# Patient Record
Sex: MALE | Race: WHITE | NOT HISPANIC OR LATINO | URBAN - METROPOLITAN AREA
[De-identification: names, ages, dates, MRNs, and addresses within clinical notes are randomized per-mention and may not be internally consistent; named-entity substitution may affect disease eponyms.]

---

## 2020-01-07 ENCOUNTER — EMERGENCY (EMERGENCY)
Facility: HOSPITAL | Age: 40
LOS: 1 days | Discharge: HOME | End: 2020-01-07
Attending: EMERGENCY MEDICINE | Admitting: EMERGENCY MEDICINE
Payer: COMMERCIAL

## 2020-01-07 VITALS
SYSTOLIC BLOOD PRESSURE: 111 MMHG | TEMPERATURE: 98 F | OXYGEN SATURATION: 100 % | RESPIRATION RATE: 18 BRPM | HEART RATE: 74 BPM | DIASTOLIC BLOOD PRESSURE: 78 MMHG

## 2020-01-07 DIAGNOSIS — Z98.89 OTHER SPECIFIED POSTPROCEDURAL STATES: Chronic | ICD-10-CM

## 2020-01-07 LAB
ALBUMIN SERPL ELPH-MCNC: 4.8 G/DL — SIGNIFICANT CHANGE UP (ref 3.3–5)
ALP SERPL-CCNC: 67 U/L — SIGNIFICANT CHANGE UP (ref 40–120)
ALT FLD-CCNC: 72 U/L — HIGH (ref 4–41)
ANION GAP SERPL CALC-SCNC: 15 MMO/L — HIGH (ref 7–14)
APPEARANCE UR: SIGNIFICANT CHANGE UP
AST SERPL-CCNC: 49 U/L — HIGH (ref 4–40)
BACTERIA # UR AUTO: NEGATIVE — SIGNIFICANT CHANGE UP
BASOPHILS # BLD AUTO: 0.04 K/UL — SIGNIFICANT CHANGE UP (ref 0–0.2)
BASOPHILS NFR BLD AUTO: 0.4 % — SIGNIFICANT CHANGE UP (ref 0–2)
BILIRUB SERPL-MCNC: 1.3 MG/DL — HIGH (ref 0.2–1.2)
BILIRUB UR-MCNC: NEGATIVE — SIGNIFICANT CHANGE UP
BLOOD UR QL VISUAL: HIGH
BUN SERPL-MCNC: 17 MG/DL — SIGNIFICANT CHANGE UP (ref 7–23)
CALCIUM SERPL-MCNC: 9.5 MG/DL — SIGNIFICANT CHANGE UP (ref 8.4–10.5)
CHLORIDE SERPL-SCNC: 98 MMOL/L — SIGNIFICANT CHANGE UP (ref 98–107)
CO2 SERPL-SCNC: 24 MMOL/L — SIGNIFICANT CHANGE UP (ref 22–31)
COLOR SPEC: YELLOW — SIGNIFICANT CHANGE UP
CREAT SERPL-MCNC: 0.92 MG/DL — SIGNIFICANT CHANGE UP (ref 0.5–1.3)
EOSINOPHIL # BLD AUTO: 0.05 K/UL — SIGNIFICANT CHANGE UP (ref 0–0.5)
EOSINOPHIL NFR BLD AUTO: 0.5 % — SIGNIFICANT CHANGE UP (ref 0–6)
GLUCOSE SERPL-MCNC: 202 MG/DL — HIGH (ref 70–99)
GLUCOSE UR-MCNC: 500 — HIGH
HCT VFR BLD CALC: 45.2 % — SIGNIFICANT CHANGE UP (ref 39–50)
HGB BLD-MCNC: 15.7 G/DL — SIGNIFICANT CHANGE UP (ref 13–17)
HYALINE CASTS # UR AUTO: NEGATIVE — SIGNIFICANT CHANGE UP
IMM GRANULOCYTES NFR BLD AUTO: 0.3 % — SIGNIFICANT CHANGE UP (ref 0–1.5)
KETONES UR-MCNC: NEGATIVE — SIGNIFICANT CHANGE UP
LEUKOCYTE ESTERASE UR-ACNC: NEGATIVE — SIGNIFICANT CHANGE UP
LYMPHOCYTES # BLD AUTO: 1.29 K/UL — SIGNIFICANT CHANGE UP (ref 1–3.3)
LYMPHOCYTES # BLD AUTO: 12.4 % — LOW (ref 13–44)
MCHC RBC-ENTMCNC: 30.9 PG — SIGNIFICANT CHANGE UP (ref 27–34)
MCHC RBC-ENTMCNC: 34.7 % — SIGNIFICANT CHANGE UP (ref 32–36)
MCV RBC AUTO: 89 FL — SIGNIFICANT CHANGE UP (ref 80–100)
MONOCYTES # BLD AUTO: 0.65 K/UL — SIGNIFICANT CHANGE UP (ref 0–0.9)
MONOCYTES NFR BLD AUTO: 6.3 % — SIGNIFICANT CHANGE UP (ref 2–14)
NEUTROPHILS # BLD AUTO: 8.33 K/UL — HIGH (ref 1.8–7.4)
NEUTROPHILS NFR BLD AUTO: 80.1 % — HIGH (ref 43–77)
NITRITE UR-MCNC: NEGATIVE — SIGNIFICANT CHANGE UP
NRBC # FLD: 0 K/UL — SIGNIFICANT CHANGE UP (ref 0–0)
PH UR: 5.5 — SIGNIFICANT CHANGE UP (ref 5–8)
PLATELET # BLD AUTO: 205 K/UL — SIGNIFICANT CHANGE UP (ref 150–400)
PMV BLD: 9.2 FL — SIGNIFICANT CHANGE UP (ref 7–13)
POTASSIUM SERPL-MCNC: 4.8 MMOL/L — SIGNIFICANT CHANGE UP (ref 3.5–5.3)
POTASSIUM SERPL-SCNC: 4.8 MMOL/L — SIGNIFICANT CHANGE UP (ref 3.5–5.3)
PROT SERPL-MCNC: 7.8 G/DL — SIGNIFICANT CHANGE UP (ref 6–8.3)
PROT UR-MCNC: 20 — SIGNIFICANT CHANGE UP
RBC # BLD: 5.08 M/UL — SIGNIFICANT CHANGE UP (ref 4.2–5.8)
RBC # FLD: 12.2 % — SIGNIFICANT CHANGE UP (ref 10.3–14.5)
RBC CASTS # UR COMP ASSIST: SIGNIFICANT CHANGE UP (ref 0–?)
SODIUM SERPL-SCNC: 137 MMOL/L — SIGNIFICANT CHANGE UP (ref 135–145)
SP GR SPEC: 1.02 — SIGNIFICANT CHANGE UP (ref 1–1.04)
SQUAMOUS # UR AUTO: SIGNIFICANT CHANGE UP
UROBILINOGEN FLD QL: NORMAL — SIGNIFICANT CHANGE UP
WBC # BLD: 10.39 K/UL — SIGNIFICANT CHANGE UP (ref 3.8–10.5)
WBC # FLD AUTO: 10.39 K/UL — SIGNIFICANT CHANGE UP (ref 3.8–10.5)
WBC UR QL: SIGNIFICANT CHANGE UP (ref 0–?)

## 2020-01-07 PROCEDURE — 99284 EMERGENCY DEPT VISIT MOD MDM: CPT

## 2020-01-07 RX ORDER — KETOROLAC TROMETHAMINE 30 MG/ML
15 SYRINGE (ML) INJECTION ONCE
Refills: 0 | Status: DISCONTINUED | OUTPATIENT
Start: 2020-01-07 | End: 2020-01-07

## 2020-01-07 RX ADMIN — Medication 15 MILLIGRAM(S): at 12:49

## 2020-01-07 NOTE — ED ADULT NURSE NOTE - OBJECTIVE STATEMENT
c/o left sided lower quadrant pain since last night denies fever chills sob, states pain is similar to previous kidney stone, denies dysuria, ambulatory to bathroom,

## 2020-01-07 NOTE — ED ADULT TRIAGE NOTE - CHIEF COMPLAINT QUOTE
Pt co left flank pain since this morning. Believes he has a kidney stone, hx of kidney stone in past. States pain was 10/10 this morning but is not 3/10. PMHX of diabetes, high cholesterol, appendectomy, part of colon removed.

## 2020-01-07 NOTE — ED PROVIDER NOTE - NSFOLLOWUPCLINICS_GEN_ALL_ED_FT
[facility]  Urology  [address] [place] [address] - [address] [place] [address]  Phone: [phone]  Fax:   Follow Up Time:

## 2020-01-07 NOTE — ED PROVIDER NOTE - ATTENDING CONTRIBUTION TO CARE
39M LLQ pain radiating to groin and testicles. Prior h/o kidney stones, this pain feels similar. No testicular swelling. No dysuria. No hematuria. No fever, no n/v/d. Pain is now improving. On exam well appearing, nad, mmm, rrr, lungs CTA b/l, abd soft NT/ND, no CVA tenderness, 2+ pulses, no edema, no rash, alert, speech clear. Pain is consistent with a kidney stone, no other GI complaints. No symptoms of infection. Given resolution of pain, no indication for imaging at this time, will treat conservatively with symptomatic treatment and patient given return precautions.

## 2020-01-07 NOTE — ED PROVIDER NOTE - PROGRESS NOTE DETAILS
pt feeling much better. labs/urinalysis c/w nephrolithiasis. uro f/u and strict return precautions given. pt feeling much better. labs/urinalysis c/w nephrolithiasis. uro f/u and strict return precautions given. pt knows to f/u with pcp about transaminitis and bili

## 2020-01-07 NOTE — ED PROVIDER NOTE - PATIENT PORTAL LINK FT
You can access the FollowMyHealth Patient Portal offered by [facility] by registering at the following website: [email or web address]. By joining [employer]’s FollowMyHealth portal, you will also be able to view your health information using other applications (apps) compatible with our system.

## 2020-01-07 NOTE — ED PROVIDER NOTE - PSH
S/P appendectomy  1990  S/P inguinal hernia repair  right, 2001  S/P meniscectomy  left, 2012  S/P tonsillectomy  1983

## 2020-01-07 NOTE — ED PROVIDER NOTE - CLINICAL SUMMARY MEDICAL DECISION MAKING FREE TEXT BOX
LLQ/groin pain + nausea. C/w past kidney stone. Pt well appearing, pain nearly resolved. Abdomen benign. Exam and history c/w kidney stone. Low suspicion for torsion or acute surgical abdomen. Labs, UA, pain control, & reassess.

## 2020-01-07 NOTE — ED PROVIDER NOTE - PHYSICAL EXAMINATION
Gen: AAOx3, non-toxic  Head: NCAT  HEENT: EOMI, oral mucosa moist, normal conjunctiva  Lung: CTAB, no respiratory distress, no wheezes/rhonchi/rales B/L, speaking in full sentences  CV: RRR, no murmurs, rubs or gallops  Abd: soft, NTND, no guarding, no CVA tenderness  MSK: no visible deformities  Neuro: No focal sensory or motor deficits  Skin: Warm, well perfused, no rash  Psych: normal affect.     ~Juan Manuel Mandujano PGY2 Gen: AAOx3, non-toxic  Head: NCAT  HEENT: EOMI, oral mucosa moist, normal conjunctiva  Lung: CTAB, no respiratory distress, no wheezes/rhonchi/rales B/L, speaking in full sentences  CV: RRR, no murmurs, rubs or gallops  Abd: soft, NTND, no guarding, no CVA tenderness  : no testicular redness/tenderness/swelling, +b/l cremasteric reflex, no inguinal hernias  MSK: no visible deformities  Neuro: No focal sensory or motor deficits  Skin: Warm, well perfused, no rash  Psych: normal affect.     ~Juan Manuel Mandujano PGY2

## 2020-01-07 NOTE — ED PROVIDER NOTE - OBJECTIVE STATEMENT
39M with PMH of HLD, DM, and kidney stones p/w sharp, colicky LLQ pain that radiates to his groin since this morning. States current symptoms exactly like past kidney stone which was CT-confirmed. Pain was 10/10 but has improved to 2/10. Associated with nausea. Denies any other symptom including fever, dysuria, frequency, hematuria, vomiting, diarrhea, chest pain, SOB.

## 2020-01-07 NOTE — ED PROVIDER NOTE - NS ED ROS FT
ROS:  GENERAL: No fever, no chills  EYES: no change in vision  HEENT: no trouble swallowing, no trouble speaking  CARDIAC: no chest pain  PULMONARY: no cough, no shortness of breath  GI: +abd pain as in HPI, +nausea.  no vomiting, no diarrhea, no constipation  : No dysuria, no frequency, no change in appearance, or odor of urine  SKIN: no rashes  NEURO: no headache, no weakness  MSK: No joint pain    Juan Manuel Mandujano PGY2

## 2020-01-07 NOTE — ED PROVIDER NOTE - NSFOLLOWUPINSTRUCTIONS_ED_ALL_ED_FT
Follow up with your PCP in 24-48 hours.   Call urology to make a follow up appointment.   May take percocet 5 mg every 6 hours for severe pain.   May take Motrin as directed on the bottle for pain control.   Return to the ER if you develop any new or worsening symptoms such as fever, chest pain, shortness of breath, numbness, weakness, abdominal pain, nausea, vomiting, or visual changes.

## 2020-01-08 LAB — SPECIMEN SOURCE: SIGNIFICANT CHANGE UP

## 2020-01-09 LAB — BACTERIA UR CULT: SIGNIFICANT CHANGE UP

## 2022-07-17 ENCOUNTER — NON-APPOINTMENT (OUTPATIENT)
Age: 42
End: 2022-07-17

## 2022-09-27 ENCOUNTER — APPOINTMENT (OUTPATIENT)
Dept: ORTHOPEDIC SURGERY | Facility: CLINIC | Age: 42
End: 2022-09-27

## 2022-09-27 VITALS — HEIGHT: 68.5 IN | BODY MASS INDEX: 25.77 KG/M2 | WEIGHT: 172 LBS

## 2022-09-27 DIAGNOSIS — Z78.9 OTHER SPECIFIED HEALTH STATUS: ICD-10-CM

## 2022-09-27 DIAGNOSIS — Z86.39 PERSONAL HISTORY OF OTHER ENDOCRINE, NUTRITIONAL AND METABOLIC DISEASE: ICD-10-CM

## 2022-09-27 PROCEDURE — 73030 X-RAY EXAM OF SHOULDER: CPT | Mod: LT

## 2022-09-27 PROCEDURE — 99203 OFFICE O/P NEW LOW 30 MIN: CPT

## 2022-09-27 RX ORDER — DICLOFENAC SODIUM 75 MG/1
75 TABLET, DELAYED RELEASE ORAL
Qty: 60 | Refills: 3 | Status: ACTIVE | COMMUNITY
Start: 2022-09-27 | End: 1900-01-01

## 2022-09-27 RX ORDER — ROSUVASTATIN CALCIUM 5 MG/1
TABLET, FILM COATED ORAL
Refills: 0 | Status: ACTIVE | COMMUNITY

## 2022-09-27 RX ORDER — FENOFIBRATE 145 MG/1
145 TABLET ORAL
Refills: 0 | Status: ACTIVE | COMMUNITY

## 2022-09-27 RX ORDER — ICOSAPENT ETHYL 1000 MG/1
1 CAPSULE ORAL
Refills: 0 | Status: ACTIVE | COMMUNITY

## 2022-09-27 RX ORDER — CHOLECALCIFEROL (VITAMIN D3) 1250 MCG
1.25 MG CAPSULE ORAL
Refills: 0 | Status: ACTIVE | COMMUNITY

## 2022-09-27 RX ORDER — METFORMIN HYDROCHLORIDE 1000 MG/1
1000 TABLET, COATED ORAL
Refills: 0 | Status: ACTIVE | COMMUNITY

## 2022-09-27 RX ORDER — TIRZEPATIDE 10 MG/.5ML
10 INJECTION, SOLUTION SUBCUTANEOUS
Refills: 0 | Status: ACTIVE | COMMUNITY

## 2022-09-27 NOTE — PHYSICAL EXAM
[NL (0-180)] : full active forward flexion 0-180 degrees [NL (0-70)] : full internal rotation 0-70 degrees [NL (0-90)] : full external rotation 0-90 degrees [Sitting] : sitting [Mild] : mild [Left] : left shoulder [There are no fractures, subluxations or dislocations. No significant abnormalities are seen] : There are no fractures, subluxations or dislocations. No significant abnormalities are seen [] : no sensory deficits [FreeTextEntry1] : foreign object arm, glucose monitor

## 2022-09-27 NOTE — ASSESSMENT
[FreeTextEntry1] : is a diabetic, will hold on an injection. Prescribed PT, diclofenac  and MRI left shoulder.

## 2022-09-27 NOTE — REASON FOR VISIT
[FreeTextEntry2] : Patient is a 41 year old RHD male presents with complaints of L Shoulder pain for several months. No injury or trauma. Pain when lifting or reaching. No Neck pain No radiating pain.No chest pain. Is a diabetic, on medication, last glucose was 166 today.

## 2022-09-27 NOTE — HISTORY OF PRESENT ILLNESS
[Left Arm] : left arm [Gradual] : gradual [8] : 8 [2] : 2 [Localized] : localized [Intermittent] : intermittent [Household chores] : household chores [Leisure] : leisure [Rest] : rest [Exercising] : exercising [Full time] : Work status: full time [] : Post Surgical Visit: no [de-identified] :

## 2022-09-28 ENCOUNTER — FORM ENCOUNTER (OUTPATIENT)
Age: 42
End: 2022-09-28

## 2022-09-29 ENCOUNTER — APPOINTMENT (OUTPATIENT)
Dept: MRI IMAGING | Facility: CLINIC | Age: 42
End: 2022-09-29

## 2022-09-29 PROCEDURE — 73221 MRI JOINT UPR EXTREM W/O DYE: CPT | Mod: LT

## 2022-10-10 ENCOUNTER — APPOINTMENT (OUTPATIENT)
Dept: ORTHOPEDIC SURGERY | Facility: CLINIC | Age: 42
End: 2022-10-10

## 2022-10-10 VITALS — HEIGHT: 68.5 IN | WEIGHT: 172 LBS | BODY MASS INDEX: 25.77 KG/M2

## 2022-10-10 DIAGNOSIS — M77.8 OTHER ENTHESOPATHIES, NOT ELSEWHERE CLASSIFIED: ICD-10-CM

## 2022-10-10 PROCEDURE — 99214 OFFICE O/P EST MOD 30 MIN: CPT

## 2022-10-10 NOTE — PHYSICAL EXAM
[Left] : left shoulder [Sitting] : sitting [5___] : internal rotation 5[unfilled]/5 [] : no sensory deficits

## 2022-10-10 NOTE — HISTORY OF PRESENT ILLNESS
[Gradual] : gradual [2] : 2 [Dull/Aching] : dull/aching [Intermittent] : intermittent [Rest] : rest [Meds] : meds [Full time] : Work status: full time [] : Post Surgical Visit: no [FreeTextEntry9] : Advil [de-identified] : 09/27/22 [de-identified] : Dr. Sims [de-identified] : MRI [de-identified] :

## 2022-10-10 NOTE — REASON FOR VISIT
[FreeTextEntry2] : Patient complains of continued L Shoulder pain since his last visit. Pain increases with certain movements. Patient states that he started taking Diclofenac but he has since stopped taking it because of nausea after taking it. Patient would like to discuss MRI results: no RC tears, has labral pathology.

## 2022-10-10 NOTE — ASSESSMENT
[FreeTextEntry1] : discussed MRI results. Advil helps, no c/o pain with working. No need foe surgery at this point.

## 2022-11-14 ENCOUNTER — APPOINTMENT (OUTPATIENT)
Dept: SURGERY | Facility: CLINIC | Age: 42
End: 2022-11-14

## 2022-11-14 VITALS
OXYGEN SATURATION: 99 % | WEIGHT: 163 LBS | DIASTOLIC BLOOD PRESSURE: 79 MMHG | BODY MASS INDEX: 24.71 KG/M2 | HEART RATE: 82 BPM | SYSTOLIC BLOOD PRESSURE: 128 MMHG | HEIGHT: 68 IN | TEMPERATURE: 98.2 F

## 2022-11-14 DIAGNOSIS — K80.20 CALCULUS OF GALLBLADDER W/OUT CHOLECYSTITIS W/OUT OBSTRUCTION: ICD-10-CM

## 2022-11-14 PROCEDURE — 99203 OFFICE O/P NEW LOW 30 MIN: CPT

## 2022-11-30 ENCOUNTER — APPOINTMENT (OUTPATIENT)
Dept: ENDOCRINOLOGY | Facility: CLINIC | Age: 42
End: 2022-11-30

## 2022-12-08 ENCOUNTER — OFFICE (OUTPATIENT)
Dept: URBAN - METROPOLITAN AREA CLINIC 109 | Facility: CLINIC | Age: 42
Setting detail: OPHTHALMOLOGY
End: 2022-12-08
Payer: COMMERCIAL

## 2022-12-08 DIAGNOSIS — E11.9: ICD-10-CM

## 2022-12-08 DIAGNOSIS — H35.413: ICD-10-CM

## 2022-12-08 PROCEDURE — 92201 OPSCPY EXTND RTA DRAW UNI/BI: CPT | Performed by: OPHTHALMOLOGY

## 2022-12-08 PROCEDURE — 92004 COMPRE OPH EXAM NEW PT 1/>: CPT | Performed by: OPHTHALMOLOGY

## 2022-12-08 ASSESSMENT — REFRACTION_AUTOREFRACTION
OS_AXIS: 82
OD_SPHERE: -0.75
OD_CYLINDER: -1.50
OD_AXIS: 111
OS_CYLINDER: -1.50
OS_SPHERE: -0.50

## 2022-12-08 ASSESSMENT — CONFRONTATIONAL VISUAL FIELD TEST (CVF)
OS_FINDINGS: FULL
OD_FINDINGS: FULL

## 2022-12-08 ASSESSMENT — TONOMETRY
OS_IOP_MMHG: 18
OD_IOP_MMHG: 18

## 2022-12-08 ASSESSMENT — SPHEQUIV_DERIVED
OS_SPHEQUIV: -1.25
OD_SPHEQUIV: -1.5

## 2022-12-08 ASSESSMENT — REFRACTION_CURRENTRX
OS_CYLINDER: -0.75
OD_AXIS: 107
OS_OVR_VA: 20/
OS_SPHERE: -0.50
OS_AXIS: 73
OD_OVR_VA: 20/
OD_CYLINDER: -0.75
OD_SPHERE: -0.50

## 2022-12-08 ASSESSMENT — VISUAL ACUITY
OS_BCVA: 20/20-1
OD_BCVA: 20/20

## 2023-03-15 ENCOUNTER — NON-APPOINTMENT (OUTPATIENT)
Age: 43
End: 2023-03-15

## 2024-04-22 ENCOUNTER — APPOINTMENT (OUTPATIENT)
Dept: PODIATRY | Facility: CLINIC | Age: 44
End: 2024-04-22
Payer: COMMERCIAL

## 2024-04-22 PROCEDURE — 73620 X-RAY EXAM OF FOOT: CPT | Mod: 50

## 2024-04-22 PROCEDURE — 99203 OFFICE O/P NEW LOW 30 MIN: CPT | Mod: 25

## 2024-04-22 RX ORDER — DICLOFENAC SODIUM 100 MG/1
100 TABLET, FILM COATED, EXTENDED RELEASE ORAL
Qty: 30 | Refills: 0 | Status: ACTIVE | COMMUNITY
Start: 2024-04-22 | End: 1900-01-01

## 2024-04-22 NOTE — HISTORY OF PRESENT ILLNESS
[FreeTextEntry1] : Flo is a 43-year-old male who presents today for the first time for care of pain in his right heel and left 5th toe.  It's been going on for a few months.  It has improved slightly since he bought [employer] sneakers.

## 2024-04-22 NOTE — PROCEDURE
[FreeTextEntry1] : Plan:   Examination.  (Rc=[id number]).  We had a lengthy discussion concerning the patient's condition.  We reviewed his x-rays.  Rx: Voltaren 100 xr qd pc.  We discussed custom foot orthosis.  He is to continue all activity to tolerance. Patient to return: 1 week

## 2024-04-22 NOTE — PHYSICAL EXAM
[FreeTextEntry3] :  DP 2/4 bilaterally,  PT 2/4 bilaterally, Temperature gradient normal, Capillary return instant x 10 [de-identified] : Patient presents with a hypermobile varus foot type.  Discomfort upon palpation of the plantar right heel and lateral to the 5th metatarsal head.  3/5 pain scale.  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 XRAY: 2 views of both feet demonstrate no plantar spur formation.  Flattening of the long arch on the lateral with splaying of the metatarsals on the AP.  No fracture or dislocation. [FreeTextEntry1] : Neurological Exam: Sharp / Dull - L: WNL. Sharp / Dull - R: WNL. Light Touch/pressure - L: WNL. Light Touch/pressure - R: WNL. Hot/cold - L: WNL. Hot/cold - R: WNL. Vibratory - L: WNL. Vibratory - R: WNL.

## 2024-04-24 NOTE — PHYSICAL EXAM
[FreeTextEntry3] :  DP 2/4 bilaterally,  PT 2/4 bilaterally, Temperature gradient normal, Capillary return instant x 10 [de-identified] : Patient presents with a hypermobile varus foot type.  Discomfort upon palpation of the plantar right heel and lateral to the 5th metatarsal head.  3/5 pain scale.  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 XRAY: 2 views of both feet demonstrate no plantar spur formation.  Flattening of the long arch on the lateral with splaying of the metatarsals on the AP.  No fracture or dislocation. [FreeTextEntry1] : Neurological Exam: Sharp / Dull - L: WNL. Sharp / Dull - R: WNL. Light Touch/pressure - L: WNL. Light Touch/pressure - R: WNL. Hot/cold - L: WNL. Hot/cold - R: WNL. Vibratory - L: WNL. Vibratory - R: WNL.

## 2024-04-24 NOTE — HISTORY OF PRESENT ILLNESS
[FreeTextEntry1] : Flo is a 43-year-old male who presents today for the first time for care of pain in his right heel and left 5th toe.  It's been going on for a few months.  It has improved slightly since he bought [employer] sneakers.

## 2024-04-24 NOTE — PROCEDURE
[FreeTextEntry1] : Plan:   Examination.  (Ih=[id number]).  We had a lengthy discussion concerning the patient's condition.  We reviewed his x-rays.  Rx: Voltaren 100 xr qd pc.  We discussed custom foot orthosis.  He is to continue all activity to tolerance. Patient to return: 1 week

## 2024-04-25 DIAGNOSIS — F19.90 OTHER PSYCHOACTIVE SUBSTANCE USE, UNSPECIFIED, UNCOMPLICATED: ICD-10-CM

## 2024-04-25 DIAGNOSIS — Z83.3 FAMILY HISTORY OF DIABETES MELLITUS: ICD-10-CM

## 2024-04-25 DIAGNOSIS — G47.30 SLEEP APNEA, UNSPECIFIED: ICD-10-CM

## 2024-04-25 DIAGNOSIS — Z87.442 PERSONAL HISTORY OF URINARY CALCULI: ICD-10-CM

## 2024-04-25 DIAGNOSIS — Z82.49 FAMILY HISTORY OF ISCHEMIC HEART DISEASE AND OTHER DISEASES OF THE CIRCULATORY SYSTEM: ICD-10-CM

## 2024-04-25 DIAGNOSIS — Z80.3 FAMILY HISTORY OF MALIGNANT NEOPLASM OF BREAST: ICD-10-CM

## 2024-04-30 ENCOUNTER — APPOINTMENT (OUTPATIENT)
Dept: PODIATRY | Facility: CLINIC | Age: 44
End: 2024-04-30
Payer: COMMERCIAL

## 2024-04-30 DIAGNOSIS — M72.2 PLANTAR FASCIAL FIBROMATOSIS: ICD-10-CM

## 2024-04-30 PROCEDURE — 99213 OFFICE O/P EST LOW 20 MIN: CPT

## 2024-04-30 NOTE — PROCEDURE
[FreeTextEntry1] : Plan:   Examination.  (Nb=[id number]).  We had a lengthy discussion concerning the patient's condition.  Patient to continue Voltaren until pain free.  The patient was biomechanically evaluated and electronicallty scanned for custom foot orthosis in the prone position.  The patient will be notified when the orthotics arrive so that they can be fitted to the patient appropriately. (Iu=).   Patient to return: 2 weeks

## 2024-04-30 NOTE — PHYSICAL EXAM
[FreeTextEntry3] :  DP 2/4 bilaterally,  PT 2/4 bilaterally, Temperature gradient normal, Capillary return instant x 10 [de-identified] : Patient presents with a hypermobile varus foot type.  Less discomfort upon palpation of the plantar right heel.  The lateral left foot is pain free.  No pain upon palpation of the fibroma right.  2/5 pain scale.  XRAY: 2 views of both feet demonstrate no plantar spur formation.  Flattening of the long arch on the lateral with splaying of the metatarsals on the AP.  No fracture or dislocation. [FreeTextEntry1] : Neurological Exam: Sharp / Dull - L: WNL. Sharp / Dull - R: WNL. Light Touch/pressure - L: WNL. Light Touch/pressure - R: WNL. Hot/cold - L: WNL. Hot/cold - R: WNL. Vibratory - L: WNL. Vibratory - R: WNL.

## 2024-04-30 NOTE — HISTORY OF PRESENT ILLNESS
[FreeTextEntry1] : Flo is a 43-year-old male who presents this afternoon for follow up care of pain in both feet.  He has been taking his Voltaren without incident.  The fibroma and tendinitis has resolved.  The heel pain is about 60% better.

## 2024-06-21 ENCOUNTER — APPOINTMENT (OUTPATIENT)
Dept: PODIATRY | Facility: CLINIC | Age: 44
End: 2024-06-21
Payer: COMMERCIAL

## 2024-06-21 DIAGNOSIS — M79.672 PAIN IN RIGHT FOOT: ICD-10-CM

## 2024-06-21 DIAGNOSIS — M79.671 PAIN IN RIGHT FOOT: ICD-10-CM

## 2024-06-21 DIAGNOSIS — M77.52 OTHER ENTHESOPATHY OF LT FOOT AND ANKLE: ICD-10-CM

## 2024-06-21 PROCEDURE — 99213 OFFICE O/P EST LOW 20 MIN: CPT

## 2024-06-21 RX ORDER — DICLOFENAC SODIUM 100 MG/1
100 TABLET, FILM COATED, EXTENDED RELEASE ORAL
Qty: 30 | Refills: 0 | Status: ACTIVE | COMMUNITY
Start: 2024-06-21 | End: 1900-01-01

## 2024-06-21 NOTE — PROCEDURE
[FreeTextEntry1] : Plan:  Exam. (My=[id number]).  Rx: Voltaren 100 XR QD x 2 more weeks.  The orthotics appeared to fit well.  The orthotics were checked to make sure they were made appropriately.  Instructions were given as to what to expect during the break in period.  She was told what to look for (ie. high spots or areas that create discomfort) so that adjustments can be made at the next visit.  The orthotics were placed in her shoes. The types of shoes that can be worn with orthotics were reviewed with patient.  A breaking in period was prescribed as follows:  wear orthotics for 1 hour the first day, then increase an hour every day until they are worn all day.  Be sure to wear them for a whole day before attempting any athletic or strenuous activity in them.  They should feel comfortable.  He is to call with any questions or problems. Patient to return: 2 weeks

## 2024-06-21 NOTE — HISTORY OF PRESENT ILLNESS
[FreeTextEntry1] : Flo is a 43-year-old male who presents for continued care of pain in both feet.  He has relief when taking his Voltaren, but the discomfort returns a few days after he stops it.  He is anxious to get his orthosis.

## 2024-06-21 NOTE — PHYSICAL EXAM
[FreeTextEntry3] :  DP 2/4 bilaterally,  PT 2/4 bilaterally, Temperature gradient normal, Capillary return instant x 10 [de-identified] : Orthopedic Exam:  Patient presents with a hypermobile varus foot type.  The orthosis well accommodates his condition. [FreeTextEntry1] : Neurological Exam: Sharp / Dull - L: WNL. Sharp / Dull - R: WNL. Light Touch/pressure - L: WNL. Light Touch/pressure - R: WNL. Hot/cold - L: WNL. Hot/cold - R: WNL. Vibratory - L: WNL. Vibratory - R: WNL.

## 2024-07-08 ENCOUNTER — APPOINTMENT (OUTPATIENT)
Dept: PODIATRY | Facility: CLINIC | Age: 44
End: 2024-07-08
Payer: COMMERCIAL

## 2024-07-08 DIAGNOSIS — M72.2 PLANTAR FASCIAL FIBROMATOSIS: ICD-10-CM

## 2024-07-08 PROCEDURE — 99213 OFFICE O/P EST LOW 20 MIN: CPT

## 2024-07-17 ENCOUNTER — OFFICE (OUTPATIENT)
Dept: URBAN - METROPOLITAN AREA CLINIC 109 | Facility: CLINIC | Age: 44
Setting detail: OPHTHALMOLOGY
End: 2024-07-17
Payer: COMMERCIAL

## 2024-07-17 DIAGNOSIS — G43.109: ICD-10-CM

## 2024-07-17 DIAGNOSIS — H35.413: ICD-10-CM

## 2024-07-17 PROCEDURE — 92014 COMPRE OPH EXAM EST PT 1/>: CPT | Performed by: OPHTHALMOLOGY

## 2024-07-17 PROCEDURE — 92250 FUNDUS PHOTOGRAPHY W/I&R: CPT | Performed by: OPHTHALMOLOGY

## 2024-07-17 ASSESSMENT — CONFRONTATIONAL VISUAL FIELD TEST (CVF)
OD_FINDINGS: FULL
OS_FINDINGS: FULL

## 2025-04-29 ENCOUNTER — NON-APPOINTMENT (OUTPATIENT)
Age: 45
End: 2025-04-29